# Patient Record
Sex: FEMALE | Race: WHITE
[De-identification: names, ages, dates, MRNs, and addresses within clinical notes are randomized per-mention and may not be internally consistent; named-entity substitution may affect disease eponyms.]

---

## 2017-05-25 ENCOUNTER — HOSPITAL ENCOUNTER (OUTPATIENT)
Dept: HOSPITAL 19 - SDCO | Age: 57
Discharge: HOME | End: 2017-05-25
Attending: SURGERY
Payer: COMMERCIAL

## 2017-05-25 VITALS — DIASTOLIC BLOOD PRESSURE: 62 MMHG | HEART RATE: 77 BPM | SYSTOLIC BLOOD PRESSURE: 115 MMHG

## 2017-05-25 VITALS — SYSTOLIC BLOOD PRESSURE: 123 MMHG | DIASTOLIC BLOOD PRESSURE: 65 MMHG | HEART RATE: 76 BPM | TEMPERATURE: 98.5 F

## 2017-05-25 VITALS — DIASTOLIC BLOOD PRESSURE: 62 MMHG | SYSTOLIC BLOOD PRESSURE: 111 MMHG | HEART RATE: 75 BPM

## 2017-05-25 VITALS — DIASTOLIC BLOOD PRESSURE: 57 MMHG | HEART RATE: 75 BPM | TEMPERATURE: 97.6 F | SYSTOLIC BLOOD PRESSURE: 109 MMHG

## 2017-05-25 VITALS — WEIGHT: 188.27 LBS | HEIGHT: 69 IN | BODY MASS INDEX: 27.89 KG/M2

## 2017-05-25 VITALS — SYSTOLIC BLOOD PRESSURE: 105 MMHG | DIASTOLIC BLOOD PRESSURE: 59 MMHG | HEART RATE: 75 BPM

## 2017-05-25 VITALS — HEART RATE: 75 BPM | DIASTOLIC BLOOD PRESSURE: 67 MMHG | SYSTOLIC BLOOD PRESSURE: 106 MMHG

## 2017-05-25 DIAGNOSIS — I25.10: ICD-10-CM

## 2017-05-25 DIAGNOSIS — C79.2: Primary | ICD-10-CM

## 2017-05-25 DIAGNOSIS — L90.5: ICD-10-CM

## 2017-05-25 DIAGNOSIS — D64.9: ICD-10-CM

## 2017-05-25 DIAGNOSIS — Z79.84: ICD-10-CM

## 2017-05-25 DIAGNOSIS — K21.9: ICD-10-CM

## 2017-05-25 DIAGNOSIS — C50.911: ICD-10-CM

## 2017-05-25 DIAGNOSIS — E11.9: ICD-10-CM

## 2017-05-25 DIAGNOSIS — Z95.9: ICD-10-CM

## 2017-05-25 DIAGNOSIS — C78.7: ICD-10-CM

## 2017-05-25 DIAGNOSIS — I10: ICD-10-CM

## 2017-05-25 DIAGNOSIS — Z87.891: ICD-10-CM

## 2017-05-25 DIAGNOSIS — G47.33: ICD-10-CM

## 2017-05-25 DIAGNOSIS — Z90.11: ICD-10-CM

## 2017-05-25 DIAGNOSIS — C77.1: ICD-10-CM

## 2018-10-23 ENCOUNTER — HOSPITAL ENCOUNTER (INPATIENT)
Dept: HOSPITAL 19 - COL.ER | Age: 58
LOS: 1 days | Discharge: ANOTHER HOSPITAL | DRG: 543 | End: 2018-10-24
Attending: INTERNAL MEDICINE | Admitting: INTERNAL MEDICINE
Payer: COMMERCIAL

## 2018-10-23 VITALS — BODY MASS INDEX: 21.55 KG/M2 | WEIGHT: 145.51 LBS | HEIGHT: 69 IN

## 2018-10-23 DIAGNOSIS — Z85.3: ICD-10-CM

## 2018-10-23 DIAGNOSIS — E83.42: ICD-10-CM

## 2018-10-23 DIAGNOSIS — C78.01: ICD-10-CM

## 2018-10-23 DIAGNOSIS — M84.451A: Primary | ICD-10-CM

## 2018-10-23 DIAGNOSIS — C78.02: ICD-10-CM

## 2018-10-23 DIAGNOSIS — E87.6: ICD-10-CM

## 2018-10-23 DIAGNOSIS — I25.10: ICD-10-CM

## 2018-10-23 DIAGNOSIS — Z66: ICD-10-CM

## 2018-10-23 DIAGNOSIS — C79.51: ICD-10-CM

## 2018-10-23 DIAGNOSIS — D64.9: ICD-10-CM

## 2018-10-23 DIAGNOSIS — E11.40: ICD-10-CM

## 2018-10-23 LAB
ALBUMIN SERPL-MCNC: 3.9 GM/DL (ref 3.5–5)
ALP SERPL-CCNC: 73 U/L (ref 50–136)
ALT SERPL-CCNC: 32 U/L (ref 9–52)
ANION GAP SERPL CALC-SCNC: 8 MMOL/L (ref 7–16)
APTT PPP: 32.8 SECONDS (ref 26–37)
AST SERPL-CCNC: 34 U/L (ref 15–37)
BASOPHILS # BLD: 0 10*3/UL (ref 0–0.2)
BASOPHILS NFR BLD AUTO: 0.3 % (ref 0–2)
BILIRUB SERPL-MCNC: 0.3 MG/DL (ref 0–1)
BUN SERPL-MCNC: 10 MG/DL (ref 7–17)
CALCIUM SERPL-MCNC: 9.7 MG/DL (ref 8.4–10.2)
CHLORIDE SERPL-SCNC: 105 MMOL/L (ref 98–107)
CO2 SERPL-SCNC: 30 MMOL/L (ref 22–30)
CREAT SERPL-SCNC: 0.36 MG/DL (ref 0.52–1.25)
EOSINOPHIL # BLD: 0 10*3/UL (ref 0–0.7)
EOSINOPHIL NFR BLD: 0.3 % (ref 0–4)
ERYTHROCYTE [DISTWIDTH] IN BLOOD BY AUTOMATED COUNT: 13.8 % (ref 11.5–14.5)
GLUCOSE SERPL-MCNC: 134 MG/DL (ref 74–106)
GRANULOCYTES # BLD AUTO: 82.3 % (ref 42.2–75.2)
HCT VFR BLD AUTO: 31.3 % (ref 37–47)
HGB BLD-MCNC: 10.1 G/DL (ref 12.5–16)
INR BLD: 1.2 (ref 0.8–3)
LIPASE SERPL-CCNC: 73 U/L (ref 23–300)
LYMPHOCYTES # BLD: 0.7 10*3/UL (ref 1.2–3.4)
LYMPHOCYTES NFR BLD: 11.3 % (ref 20–51)
MCH RBC QN AUTO: 27 PG (ref 27–31)
MCHC RBC AUTO-ENTMCNC: 32 G/DL (ref 33–37)
MCV RBC AUTO: 85 FL (ref 80–100)
MONOCYTES # BLD: 0.4 10*3/UL (ref 0.1–0.6)
MONOCYTES NFR BLD AUTO: 5.5 % (ref 1.7–9.3)
NEUTROPHILS # BLD: 5.4 10*3/UL (ref 1.4–6.5)
PLATELET # BLD AUTO: 195 K/MM3 (ref 130–400)
PMV BLD AUTO: 8.5 FL (ref 7.4–10.4)
POTASSIUM SERPL-SCNC: 3.5 MMOL/L (ref 3.4–5)
PROT SERPL-MCNC: 7.6 GM/DL (ref 6.4–8.2)
PROTHROMBIN TIME: 13.5 SECONDS (ref 9.7–12.8)
RBC # BLD AUTO: 3.7 M/MM3 (ref 4.1–5.3)
SODIUM SERPL-SCNC: 143 MMOL/L (ref 137–145)

## 2018-10-23 PROCEDURE — A9284 NON-ELECTRONIC SPIROMETER: HCPCS

## 2018-10-24 VITALS — DIASTOLIC BLOOD PRESSURE: 52 MMHG | HEART RATE: 95 BPM | TEMPERATURE: 99.6 F | SYSTOLIC BLOOD PRESSURE: 101 MMHG

## 2018-10-24 VITALS — DIASTOLIC BLOOD PRESSURE: 64 MMHG | SYSTOLIC BLOOD PRESSURE: 119 MMHG | TEMPERATURE: 98.9 F | HEART RATE: 98 BPM

## 2018-10-24 VITALS — SYSTOLIC BLOOD PRESSURE: 121 MMHG | DIASTOLIC BLOOD PRESSURE: 71 MMHG | HEART RATE: 88 BPM | TEMPERATURE: 98.3 F

## 2018-10-24 VITALS — HEART RATE: 98 BPM | DIASTOLIC BLOOD PRESSURE: 51 MMHG | TEMPERATURE: 98.4 F | SYSTOLIC BLOOD PRESSURE: 86 MMHG

## 2018-10-24 LAB
ANION GAP SERPL CALC-SCNC: 5 MMOL/L (ref 7–16)
BASOPHILS # BLD: 0 10*3/UL (ref 0–0.2)
BASOPHILS NFR BLD AUTO: 0.3 % (ref 0–2)
BUN SERPL-MCNC: 11 MG/DL (ref 7–17)
CALCIUM SERPL-MCNC: 9.1 MG/DL (ref 8.4–10.2)
CHLORIDE SERPL-SCNC: 103 MMOL/L (ref 98–107)
CO2 SERPL-SCNC: 33 MMOL/L (ref 22–30)
CREAT SERPL-SCNC: 0.38 MG/DL (ref 0.52–1.25)
EOSINOPHIL # BLD: 0 10*3/UL (ref 0–0.7)
EOSINOPHIL NFR BLD: 0.7 % (ref 0–4)
ERYTHROCYTE [DISTWIDTH] IN BLOOD BY AUTOMATED COUNT: 14 % (ref 11.5–14.5)
GLUCOSE SERPL-MCNC: 110 MG/DL (ref 74–106)
GRANULOCYTES # BLD AUTO: 67.2 % (ref 42.2–75.2)
HCT VFR BLD AUTO: 27.5 % (ref 37–47)
HGB BLD-MCNC: 8.8 G/DL (ref 12.5–16)
LYMPHOCYTES # BLD: 1.3 10*3/UL (ref 1.2–3.4)
LYMPHOCYTES NFR BLD: 22.2 % (ref 20–51)
MCH RBC QN AUTO: 27 PG (ref 27–31)
MCHC RBC AUTO-ENTMCNC: 32 G/DL (ref 33–37)
MCV RBC AUTO: 85 FL (ref 80–100)
MONOCYTES # BLD: 0.5 10*3/UL (ref 0.1–0.6)
MONOCYTES NFR BLD AUTO: 9.4 % (ref 1.7–9.3)
NEUTROPHILS # BLD: 3.9 10*3/UL (ref 1.4–6.5)
PLATELET # BLD AUTO: 188 K/MM3 (ref 130–400)
PMV BLD AUTO: 8.5 FL (ref 7.4–10.4)
POTASSIUM SERPL-SCNC: 3.2 MMOL/L (ref 3.4–5)
RBC # BLD AUTO: 3.22 M/MM3 (ref 4.1–5.3)
SODIUM SERPL-SCNC: 141 MMOL/L (ref 137–145)

## 2018-11-06 ENCOUNTER — HOSPITAL ENCOUNTER (OUTPATIENT)
Dept: HOSPITAL 19 - ZLAB.STJ | Age: 58
End: 2018-11-06
Attending: INTERNAL MEDICINE

## 2018-11-06 DIAGNOSIS — R68.89: Primary | ICD-10-CM

## 2018-11-06 LAB
ERYTHROCYTE [DISTWIDTH] IN BLOOD BY AUTOMATED COUNT: 17 % (ref 11.5–14.5)
HCT VFR BLD AUTO: 32.7 % (ref 37–47)
HGB BLD-MCNC: 10.3 G/DL (ref 12.5–16)
MCH RBC QN AUTO: 28 PG (ref 27–31)
MCHC RBC AUTO-ENTMCNC: 32 G/DL (ref 33–37)
MCV RBC AUTO: 88 FL (ref 80–100)
PLATELET # BLD AUTO: 407 K/MM3 (ref 130–400)
PMV BLD AUTO: 9 FL (ref 7.4–10.4)
RBC # BLD AUTO: 3.7 M/MM3 (ref 4.1–5.3)

## 2018-11-14 ENCOUNTER — HOSPITAL ENCOUNTER (OUTPATIENT)
Dept: HOSPITAL 19 - ZLAB.STJ | Age: 58
End: 2018-11-14
Attending: INTERNAL MEDICINE

## 2018-11-14 DIAGNOSIS — R68.89: Primary | ICD-10-CM

## 2018-11-14 LAB
BASOPHILS # BLD: 0 10*3/UL (ref 0–0.2)
BASOPHILS NFR BLD AUTO: 0.1 % (ref 0–2)
EOSINOPHIL # BLD: 0.1 10*3/UL (ref 0–0.7)
EOSINOPHIL NFR BLD: 0.7 % (ref 0–4)
ERYTHROCYTE [DISTWIDTH] IN BLOOD BY AUTOMATED COUNT: 17.7 % (ref 11.5–14.5)
GRANULOCYTES # BLD AUTO: 72 % (ref 42.2–75.2)
HCT VFR BLD AUTO: 35.1 % (ref 37–47)
HGB BLD-MCNC: 11.3 G/DL (ref 12.5–16)
LYMPHOCYTES # BLD: 2 10*3/UL (ref 1.2–3.4)
LYMPHOCYTES NFR BLD: 20.1 % (ref 20–51)
MCH RBC QN AUTO: 28 PG (ref 27–31)
MCHC RBC AUTO-ENTMCNC: 32 G/DL (ref 33–37)
MCV RBC AUTO: 88 FL (ref 80–100)
MONOCYTES # BLD: 0.6 10*3/UL (ref 0.1–0.6)
MONOCYTES NFR BLD AUTO: 5.9 % (ref 1.7–9.3)
NEUTROPHILS # BLD: 7.3 10*3/UL (ref 1.4–6.5)
PLATELET # BLD AUTO: 223 K/MM3 (ref 130–400)
PMV BLD AUTO: 8.4 FL (ref 7.4–10.4)
RBC # BLD AUTO: 4 M/MM3 (ref 4.1–5.3)

## 2019-02-03 ENCOUNTER — HOSPITAL ENCOUNTER (OUTPATIENT)
Dept: HOSPITAL 6 - LAB | Age: 59
End: 2019-02-03
Payer: COMMERCIAL

## 2019-02-03 DIAGNOSIS — R30.0: ICD-10-CM

## 2019-02-03 DIAGNOSIS — R82.71: Primary | ICD-10-CM

## 2019-02-03 LAB
APPEARANCE UR: (no result)
BILIRUB UR QL STRIP.AUTO: NEGATIVE
COLOR UR AUTO: YELLOW
ERYTHROCYTE [DISTWIDTH] IN BLOOD BY AUTOMATED COUNT: 15.6 % (ref 11.5–14.5)
HCT VFR BLD AUTO: 33.7 % (ref 37–47)
HGB BLD-MCNC: 10.2 G/DL (ref 12.5–16)
KETONES UR QL STRIP.AUTO: NEGATIVE
LEUKOCYTE ESTERASE UR QL STRIP: NEGATIVE
MCH RBC QN AUTO: 27 PG (ref 27–31)
MCHC RBC AUTO-ENTMCNC: 30 G/DL (ref 33–37)
MCV RBC AUTO: 90 FL (ref 78–100)
NITRITE UR QL STRIP: NEGATIVE
PH UR STRIP.AUTO: 6.5 [PH] (ref 5–8)
PLATELET # BLD AUTO: 355 K/MM3 (ref 130–400)
PMV BLD AUTO: 8.5 FL (ref 7.4–10.4)
PROT ?TM UR-MCNC: (no result) MG/DL
RBC # BLD AUTO: 3.75 M/MM3 (ref 4.1–5.3)
RBC UR QL AUTO: (no result)
SP GR UR STRIP.AUTO: 1.03 (ref 1–1.03)
UROBILINOGEN UR-MCNC: NORMAL MG/DL
WBC # BLD AUTO: 8.5 K/MM3 (ref 4.8–10.8)
WBC #/AREA URNS HPF: (no result) /HPF (ref 0–3)

## 2019-02-08 ENCOUNTER — HOSPITAL ENCOUNTER (EMERGENCY)
Dept: HOSPITAL 19 - COL.ER | Age: 59
Discharge: HOME | End: 2019-02-08
Payer: COMMERCIAL

## 2019-02-08 VITALS — HEART RATE: 87 BPM | DIASTOLIC BLOOD PRESSURE: 70 MMHG | SYSTOLIC BLOOD PRESSURE: 119 MMHG

## 2019-02-08 VITALS — TEMPERATURE: 98.5 F

## 2019-02-08 VITALS — WEIGHT: 165.35 LBS | HEIGHT: 69.02 IN | BODY MASS INDEX: 24.49 KG/M2

## 2019-02-08 DIAGNOSIS — Z79.84: ICD-10-CM

## 2019-02-08 DIAGNOSIS — I25.10: ICD-10-CM

## 2019-02-08 DIAGNOSIS — Z85.3: ICD-10-CM

## 2019-02-08 DIAGNOSIS — E11.9: ICD-10-CM

## 2019-02-08 DIAGNOSIS — R10.9: Primary | ICD-10-CM

## 2019-02-08 DIAGNOSIS — Z98.51: ICD-10-CM

## 2019-02-08 DIAGNOSIS — Z87.891: ICD-10-CM

## 2019-02-08 LAB
ALBUMIN SERPL-MCNC: 4.1 GM/DL (ref 3.5–5)
ALP SERPL-CCNC: 111 U/L (ref 50–136)
ALT SERPL-CCNC: 32 U/L (ref 9–52)
ANION GAP SERPL CALC-SCNC: 12 MMOL/L (ref 7–16)
AST SERPL-CCNC: 45 U/L (ref 15–37)
BASOPHILS # BLD: 0 10*3/UL (ref 0–0.2)
BASOPHILS NFR BLD AUTO: 0.3 % (ref 0–2)
BILIRUB SERPL-MCNC: 0.3 MG/DL (ref 0–1)
BUN SERPL-MCNC: 10 MG/DL (ref 7–17)
CALCIUM SERPL-MCNC: 10.4 MG/DL (ref 8.4–10.2)
CHLORIDE SERPL-SCNC: 98 MMOL/L (ref 98–107)
CO2 SERPL-SCNC: 30 MMOL/L (ref 22–30)
CREAT SERPL-SCNC: 0.42 MG/DL (ref 0.52–1.25)
CRP SERPL-MCNC: 4.7 MG/DL (ref 0–0.9)
EOSINOPHIL # BLD: 0.1 10*3/UL (ref 0–0.7)
EOSINOPHIL NFR BLD: 2.1 % (ref 0–4)
ERYTHROCYTE [DISTWIDTH] IN BLOOD BY AUTOMATED COUNT: 15.4 % (ref 11.5–14.5)
GLUCOSE SERPL-MCNC: 141 MG/DL (ref 74–106)
GRANULOCYTES # BLD AUTO: 64 % (ref 42.2–75.2)
HCT VFR BLD AUTO: 35.2 % (ref 37–47)
HGB BLD-MCNC: 10.8 G/DL (ref 12.5–16)
HYALINE CASTS #/AREA URNS LPF: (no result) /LPF
INR BLD: 1.1 (ref 0.8–3)
LIPASE SERPL-CCNC: 60 U/L (ref 23–300)
LYMPHOCYTES # BLD: 1.4 10*3/UL (ref 1.2–3.4)
LYMPHOCYTES NFR BLD: 23.7 % (ref 20–51)
MCH RBC QN AUTO: 27 PG (ref 27–31)
MCHC RBC AUTO-ENTMCNC: 31 G/DL (ref 33–37)
MCV RBC AUTO: 88 FL (ref 80–100)
MONOCYTES # BLD: 0.6 10*3/UL (ref 0.1–0.6)
MONOCYTES NFR BLD AUTO: 9.7 % (ref 1.7–9.3)
NEUTROPHILS # BLD: 3.9 10*3/UL (ref 1.4–6.5)
PH UR STRIP.AUTO: 5 [PH] (ref 5–8)
PLATELET # BLD AUTO: 374 K/MM3 (ref 130–400)
PMV BLD AUTO: 8.2 FL (ref 7.4–10.4)
POTASSIUM SERPL-SCNC: 4.2 MMOL/L (ref 3.4–5)
PROT SERPL-MCNC: 8.1 GM/DL (ref 6.4–8.2)
PROTHROMBIN TIME: 12.8 SECONDS (ref 9.7–12.8)
RBC # BLD AUTO: 3.98 M/MM3 (ref 4.1–5.3)
RBC # UR: (no result) /HPF
SODIUM SERPL-SCNC: 139 MMOL/L (ref 137–145)
SP GR UR STRIP.AUTO: 1.02 (ref 1–1.03)
URN COLLECT METHOD CLASS: (no result)
UROBILINOGEN UR STRIP.AUTO-MCNC: 2 MG/DL

## 2019-06-09 ENCOUNTER — HOSPITAL ENCOUNTER (INPATIENT)
Dept: HOSPITAL 19 - COL.ER | Age: 59
LOS: 2 days | Discharge: HOSPICE | DRG: 54 | End: 2019-06-11
Attending: FAMILY MEDICINE | Admitting: FAMILY MEDICINE
Payer: COMMERCIAL

## 2019-06-09 VITALS — DIASTOLIC BLOOD PRESSURE: 54 MMHG | HEART RATE: 96 BPM | SYSTOLIC BLOOD PRESSURE: 98 MMHG | TEMPERATURE: 98.2 F

## 2019-06-09 VITALS — WEIGHT: 142.42 LBS | BODY MASS INDEX: 32.96 KG/M2 | HEIGHT: 55 IN

## 2019-06-09 VITALS — SYSTOLIC BLOOD PRESSURE: 84 MMHG | TEMPERATURE: 98 F | HEART RATE: 82 BPM | DIASTOLIC BLOOD PRESSURE: 51 MMHG

## 2019-06-09 VITALS — TEMPERATURE: 99 F | DIASTOLIC BLOOD PRESSURE: 66 MMHG | SYSTOLIC BLOOD PRESSURE: 111 MMHG | HEART RATE: 104 BPM

## 2019-06-09 DIAGNOSIS — E11.65: ICD-10-CM

## 2019-06-09 DIAGNOSIS — R50.9: ICD-10-CM

## 2019-06-09 DIAGNOSIS — Z51.5: ICD-10-CM

## 2019-06-09 DIAGNOSIS — C79.31: Primary | ICD-10-CM

## 2019-06-09 DIAGNOSIS — G93.6: ICD-10-CM

## 2019-06-09 DIAGNOSIS — Z66: ICD-10-CM

## 2019-06-09 DIAGNOSIS — I25.10: ICD-10-CM

## 2019-06-09 DIAGNOSIS — C79.51: ICD-10-CM

## 2019-06-09 DIAGNOSIS — C50.919: ICD-10-CM

## 2019-06-09 LAB
ALBUMIN SERPL-MCNC: 3.6 GM/DL (ref 3.5–5)
ALP SERPL-CCNC: 117 U/L (ref 50–136)
ALT SERPL-CCNC: < 6 U/L (ref 9–52)
ANION GAP SERPL CALC-SCNC: 13 MMOL/L (ref 7–16)
AST SERPL-CCNC: 32 U/L (ref 15–37)
BILIRUB SERPL-MCNC: 0.6 MG/DL (ref 0–1)
BUN SERPL-MCNC: 10 MG/DL (ref 7–17)
CALCIUM SERPL-MCNC: 9.7 MG/DL (ref 8.4–10.2)
CHLORIDE SERPL-SCNC: 96 MMOL/L (ref 98–107)
CO2 SERPL-SCNC: 27 MMOL/L (ref 22–30)
CREAT SERPL-SCNC: 0.48 UMOL/L (ref 0.52–1.25)
EOSINOPHIL NFR BLD: 1 % (ref 0–4)
ERYTHROCYTE [DISTWIDTH] IN BLOOD BY AUTOMATED COUNT: 16.8 % (ref 11.5–14.5)
GLUCOSE SERPL-MCNC: 216 MG/DL (ref 74–106)
HCT VFR BLD AUTO: 30.4 % (ref 37–47)
HGB BLD-MCNC: 9.4 G/DL (ref 12.5–16)
HYPOCHROMIA BLD QL SMEAR: (no result)
LIPASE SERPL-CCNC: 22 U/L (ref 23–300)
LYMPHOCYTES NFR BLD MANUAL: 8 % (ref 20–51)
MCH RBC QN AUTO: 26 PG (ref 27–31)
MCHC RBC AUTO-ENTMCNC: 31 G/DL (ref 33–37)
MCV RBC AUTO: 84 FL (ref 80–100)
METAMYELOCYTES NFR BLD MANUAL: 5 % (ref 0–0)
MONOCYTES NFR BLD: 10 % (ref 1.7–9.3)
NEUTS BAND NFR BLD: 22 % (ref 0–10)
NEUTS SEG NFR BLD MANUAL: 54 % (ref 42–75.2)
NRBC BLD AUTO-RTO: 1 % (ref 0–6)
PH UR STRIP.AUTO: 5 [PH] (ref 5–8)
PLATELET # BLD AUTO: 198 K/MM3 (ref 130–400)
PLATELET BLD QL SMEAR: NORMAL
PMV BLD AUTO: 8.2 FL (ref 7.4–10.4)
POTASSIUM SERPL-SCNC: 3.7 MMOL/L (ref 3.4–5)
PROT SERPL-MCNC: 7.4 GM/DL (ref 6.4–8.2)
RBC # BLD AUTO: 3.64 M/MM3 (ref 4.1–5.3)
RBC # UR: (no result) /HPF
SODIUM SERPL-SCNC: 136 MMOL/L (ref 137–145)
SP GR UR STRIP.AUTO: 1.02 (ref 1–1.03)
SQUAMOUS # URNS: (no result) /HPF
URN COLLECT METHOD CLASS: (no result)

## 2019-06-09 PROCEDURE — A4216 STERILE WATER/SALINE, 10 ML: HCPCS

## 2019-06-09 NOTE — NUR
Shift assessment complete. Pt resting in bed, awake, a&o c int forgetfullness,
cooperative c cares. Pt c/o chronic pain to pelvis/L femur, rated 2/10 at
rest et 8/10 c any movement. Pt assisted to bedpan at this time causing
increased pain; PRN pain med admin per req, 2 tab percocet per home dose. Pt
denies any other c/o. PAC noted to L chest, patent c good blood return. O2 per
NC. Pt denies further needs. Call light in reach, bed alarm on. Will monitor.

## 2019-06-09 NOTE — NUR
Pt arrived to room 307 at this time. She is A/O x3, but patient reports
forgetfulness. Pt states she has pain to pelvis and L femur with movement. At
rest pain limited. Pt assisted to use of bedpan as patient states she has BLE
weakness, no urination produced. Pt denies N/V. No SOB, pt on 2L O2 via NC.
PAC accessed in ER, no complications visualized. POC discussed with patient
who verbalizes understanding. No needs at this time. Call light within reach.
Will continue to monitor.

## 2019-06-10 VITALS — DIASTOLIC BLOOD PRESSURE: 51 MMHG | SYSTOLIC BLOOD PRESSURE: 86 MMHG | HEART RATE: 85 BPM | TEMPERATURE: 98.7 F

## 2019-06-10 VITALS — DIASTOLIC BLOOD PRESSURE: 56 MMHG | HEART RATE: 88 BPM | TEMPERATURE: 98.9 F | SYSTOLIC BLOOD PRESSURE: 106 MMHG

## 2019-06-10 VITALS — TEMPERATURE: 99.2 F | HEART RATE: 91 BPM | DIASTOLIC BLOOD PRESSURE: 58 MMHG | SYSTOLIC BLOOD PRESSURE: 106 MMHG

## 2019-06-10 VITALS — HEART RATE: 110 BPM | DIASTOLIC BLOOD PRESSURE: 50 MMHG | TEMPERATURE: 97.7 F | SYSTOLIC BLOOD PRESSURE: 93 MMHG

## 2019-06-10 LAB
ANION GAP SERPL CALC-SCNC: 8 MMOL/L (ref 7–16)
BUN SERPL-MCNC: 9 MG/DL (ref 7–17)
CALCIUM SERPL-MCNC: 8.9 MG/DL (ref 8.4–10.2)
CHLORIDE SERPL-SCNC: 101 MMOL/L (ref 98–107)
CO2 SERPL-SCNC: 28 MMOL/L (ref 22–30)
CREAT SERPL-SCNC: 0.36 UMOL/L (ref 0.52–1.25)
EOSINOPHIL NFR BLD: 8 % (ref 0–4)
ERYTHROCYTE [DISTWIDTH] IN BLOOD BY AUTOMATED COUNT: 16.7 % (ref 11.5–14.5)
GLUCOSE SERPL-MCNC: 135 MG/DL (ref 74–106)
HCT VFR BLD AUTO: 26.7 % (ref 37–47)
HGB BLD-MCNC: 8.1 G/DL (ref 12.5–16)
LYMPHOCYTES NFR BLD MANUAL: 22 % (ref 20–51)
MCH RBC QN AUTO: 26 PG (ref 27–31)
MCHC RBC AUTO-ENTMCNC: 30 G/DL (ref 33–37)
MCV RBC AUTO: 86 FL (ref 80–100)
MONOCYTES NFR BLD: 14 % (ref 1.7–9.3)
NEUTS BAND NFR BLD: 3 % (ref 0–10)
NEUTS SEG NFR BLD MANUAL: 53 % (ref 42–75.2)
PLATELET # BLD AUTO: 209 K/MM3 (ref 130–400)
PLATELET BLD QL SMEAR: NORMAL
PMV BLD AUTO: 8.7 FL (ref 7.4–10.4)
POTASSIUM SERPL-SCNC: 3.4 MMOL/L (ref 3.4–5)
RBC # BLD AUTO: 3.11 M/MM3 (ref 4.1–5.3)
SODIUM SERPL-SCNC: 137 MMOL/L (ref 137–145)

## 2019-06-10 NOTE — NUR
SW met with the patient to discuss discharge plan. The patient resides at
[facility] in assisted living. The patient's PCP is Mignon Holloway PA-C
and her DPOA-HC is in EMR. A palliative care consult was ordered and the
patient is wanting to focus on comfort care and pursue [facility]. Palliative Care Nurse, Flori, faxed [facility] referral [facility]. [facility] plans to meet with the patient tomorrow, 6/11. SW
to continue to follow.

## 2019-06-10 NOTE — NUR
Pt resting in bed, condition unchanged. Pt continued c/o pain to pelvis et
LLE; min pain while at rest, significant c any movement.  Pt has slept well
this shift. Pain moderately well contolled c frequent pain med admin up req,
pt following home regimine. Pt has otherwise denied c/o or needs. Pain med
recently admin, pt denies further needs. Call light in reach, bed alarm on.

## 2019-06-10 NOTE — NUR
Attempted to meet with pt ad this morning.  She became angry accusing me
of "telling her she had shingles on her butt" at [facility].
Reassured pt that this was our first meeting and that I had not seen her
before so.  States she thinks her brain mets are worse and that is causing her
to feel the way she does.  Advised me that she will need to void and then go
to a scan and then I can come talk with her.

## 2019-06-10 NOTE — NUR
Patient assisted off bedpan. patient has had 4 bowel movements today ranging
from mix of liquid to semi solid and liquid stools. Barrier cream applied to
bottom.

## 2019-06-10 NOTE — NUR
Patient assessment complete and charted. patient on 2L NC, denies SOB and
dizziness. Denies chest pain or nausea. patient has had 2 BM's this morning,
brown liquid stools. Pt refused colace and miralax. Pt heels are reddened,
this nurse placed pillow under to elevate. Coccyx has couple of small
and scabbed open sores. Pt denies other needs at this time. Call light within
reach.

## 2019-06-10 NOTE — NUR
Shift assessment complete. Pt resting in bed, awake, a&o c int forgetfullness.
Pt reports cont chronic pain to pelvis/back/femur; PRN pain meds admin per pt
req. PAC noted to L chest, patent c good blood return. POC including comfort
measures discussed c pt. Pt denies further needs at this time. Call light
in reach, bed alarm on. Will monitor.

## 2019-06-10 NOTE — NUR
Pt c/o increased pain to L leg this shift. Reports using bedpan several times
during day shift. Pt also reports getting her L leg "hug up" on the bedpan c
last use, c/o increased pain since that time. PRN percocet x1 dose et roxanol
x2 doses given;  PRN Percocet admin again now per req et pt states "it's
starting to get better... I think this'll do it". Pt has had few other c/o or
requests this shift. Call light in reach, bed alarm on. Will continue to
monitor.

## 2019-06-10 NOTE — NUR
Met with patient and Dr Drake this morning to discuss goals of care.  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
This is actually what pt had told me she thought was the problem earlier
today.  She listened carefully to the results and then stated, it is time to
go to the [facility].  This pain isn't going to get a lot
better and I don't want to live like this.  Pt requested that I call her
sister in law, rather than her brother, to advise of her decision.  She
reports that her brother is not handling this situation well.  Also requested
to talk with [facility] house reporesentative here at hospital and call and
referral was made to [facility] relaying this message.
Support provided.  Message left for ALBA and contact information was provided.
Pt requested that we not contact her brother at this time.

## 2019-06-11 VITALS — HEART RATE: 110 BPM | DIASTOLIC BLOOD PRESSURE: 50 MMHG | SYSTOLIC BLOOD PRESSURE: 93 MMHG | TEMPERATURE: 97.7 F

## 2019-06-11 NOTE — NUR
patient transfered to [facility]. EMS here to transfer patient. patient
assisted onto stretcher. All belongings transferred with patient. This nurse
will call report.

## 2019-06-11 NOTE — NUR
The patient was accepted to [facility] and Dr. Josemanuel Lo will follow.
 
The patient is to discharge today, 6/11, to [facility].
Transportation was set for 1300, via [facility]. ALEKSANDER informed the
patient, patient's brother, nurse, and Dada at [facility]. They were
all in agreeance. No additional needs at this time.

## 2019-06-11 NOTE — NUR
Pt resting in bed, condition unchanged. Pt has had continued c/o pain to
pelvis/hips c increased pain to L femur this shift. Pain well controlled c PRN
pain meds admin frequently upon pt request. Pt has otherwise rested c few
needs. Pt now c/o pain "8/10"; will give percocet when able per request. Pt
denies any other c/o or requests. Call light in reach, bed alarm on.

## 2019-06-11 NOTE — NUR
Patient assessment complete and charted. Patient assisted onto bedpan. Patient
rating pain 10/10, reports pain in knees and hips. No BM this morning.
Administered PRN flexeril per MAR. Barrier cream applied to patient
bottom. 2 small skin tears, 1 open, 1 scabbed over. Bottom reddened.
Pillow placed under knees, heels are reddened. Denies other needs at this
time. Call light within reach.

## 2019-06-11 NOTE — NUR
Susanna and her brother have met with Dada from [facility] and feel
ready for discharge.  We are looking at discharge at 1pm.  Dr Drake reviewed
her medications and general plan and both seem comfortable with the discharge
plan.  Neither reports questions or concerns.

## 2019-06-11 NOTE — NUR
Spoke with pt, her DPOA-HC, Nikhil Mendoza, and with hospice representative, Dada.
 They will meet together at 0945 this morning to discuss financial questions.
Pt was given a comfort quilt and also a fan as she is currently feeling very
hot and sweaty.